# Patient Record
Sex: MALE | Race: OTHER | HISPANIC OR LATINO | URBAN - METROPOLITAN AREA
[De-identification: names, ages, dates, MRNs, and addresses within clinical notes are randomized per-mention and may not be internally consistent; named-entity substitution may affect disease eponyms.]

---

## 2018-02-24 ENCOUNTER — INPATIENT (INPATIENT)
Facility: HOSPITAL | Age: 38
LOS: 5 days | Discharge: HOME | End: 2018-03-02
Attending: PSYCHIATRY & NEUROLOGY

## 2018-02-24 VITALS
OXYGEN SATURATION: 97 % | HEART RATE: 98 BPM | DIASTOLIC BLOOD PRESSURE: 69 MMHG | RESPIRATION RATE: 18 BRPM | SYSTOLIC BLOOD PRESSURE: 118 MMHG | TEMPERATURE: 98 F

## 2018-02-24 DIAGNOSIS — F33.2 MAJOR DEPRESSIVE DISORDER, RECURRENT SEVERE WITHOUT PSYCHOTIC FEATURES: ICD-10-CM

## 2018-02-24 DIAGNOSIS — F31.9 BIPOLAR DISORDER, UNSPECIFIED: ICD-10-CM

## 2018-02-24 DIAGNOSIS — Z72.89 OTHER PROBLEMS RELATED TO LIFESTYLE: ICD-10-CM

## 2018-02-24 DIAGNOSIS — F33.3 MAJOR DEPRESSIVE DISORDER, RECURRENT, SEVERE WITH PSYCHOTIC SYMPTOMS: ICD-10-CM

## 2018-02-24 DIAGNOSIS — R45.851 SUICIDAL IDEATIONS: ICD-10-CM

## 2018-02-24 DIAGNOSIS — F60.2 ANTISOCIAL PERSONALITY DISORDER: ICD-10-CM

## 2018-02-24 LAB
ALBUMIN SERPL ELPH-MCNC: 4.4 G/DL — SIGNIFICANT CHANGE UP (ref 3–5.5)
ALP SERPL-CCNC: 57 U/L — SIGNIFICANT CHANGE UP (ref 30–115)
ALT FLD-CCNC: 21 U/L — SIGNIFICANT CHANGE UP (ref 0–41)
ANION GAP SERPL CALC-SCNC: 10 MMOL/L — SIGNIFICANT CHANGE UP (ref 7–14)
APAP SERPL-MCNC: <10 UG/ML — SIGNIFICANT CHANGE UP (ref 10–30)
APPEARANCE UR: CLEAR — SIGNIFICANT CHANGE UP
AST SERPL-CCNC: 21 U/L — SIGNIFICANT CHANGE UP (ref 0–41)
BASOPHILS # BLD AUTO: 0.02 K/UL — SIGNIFICANT CHANGE UP (ref 0–0.2)
BASOPHILS NFR BLD AUTO: 0.2 % — SIGNIFICANT CHANGE UP (ref 0–1)
BILIRUB DIRECT SERPL-MCNC: 0.2 MG/DL — SIGNIFICANT CHANGE UP (ref 0–0.2)
BILIRUB INDIRECT FLD-MCNC: 0.4 MG/DL — SIGNIFICANT CHANGE UP
BILIRUB SERPL-MCNC: 0.6 MG/DL — SIGNIFICANT CHANGE UP (ref 0.2–1.2)
BILIRUB UR-MCNC: NEGATIVE — SIGNIFICANT CHANGE UP
BUN SERPL-MCNC: 16 MG/DL — SIGNIFICANT CHANGE UP (ref 10–20)
CALCIUM SERPL-MCNC: 9.1 MG/DL — SIGNIFICANT CHANGE UP (ref 8.5–10.1)
CHLORIDE SERPL-SCNC: 108 MMOL/L — SIGNIFICANT CHANGE UP (ref 98–110)
CO2 SERPL-SCNC: 23 MMOL/L — SIGNIFICANT CHANGE UP (ref 17–32)
COLOR SPEC: YELLOW — SIGNIFICANT CHANGE UP
CREAT SERPL-MCNC: 1 MG/DL — SIGNIFICANT CHANGE UP (ref 0.7–1.5)
DIFF PNL FLD: NEGATIVE — SIGNIFICANT CHANGE UP
EOSINOPHIL # BLD AUTO: 0.07 K/UL — SIGNIFICANT CHANGE UP (ref 0–0.7)
EOSINOPHIL NFR BLD AUTO: 0.8 % — SIGNIFICANT CHANGE UP (ref 0–8)
GLUCOSE SERPL-MCNC: 114 MG/DL — HIGH (ref 70–110)
GLUCOSE UR QL: NEGATIVE MG/DL — SIGNIFICANT CHANGE UP
HCT VFR BLD CALC: 42 % — SIGNIFICANT CHANGE UP (ref 42–52)
HGB BLD-MCNC: 14.3 G/DL — SIGNIFICANT CHANGE UP (ref 14–18)
IMM GRANULOCYTES NFR BLD AUTO: 0.4 % — HIGH (ref 0.1–0.3)
KETONES UR-MCNC: NEGATIVE — SIGNIFICANT CHANGE UP
LEUKOCYTE ESTERASE UR-ACNC: NEGATIVE — SIGNIFICANT CHANGE UP
LYMPHOCYTES # BLD AUTO: 2.71 K/UL — SIGNIFICANT CHANGE UP (ref 1.2–3.4)
LYMPHOCYTES # BLD AUTO: 32.8 % — SIGNIFICANT CHANGE UP (ref 20.5–51.1)
MCHC RBC-ENTMCNC: 28.1 PG — SIGNIFICANT CHANGE UP (ref 27–31)
MCHC RBC-ENTMCNC: 34 G/DL — SIGNIFICANT CHANGE UP (ref 32–37)
MCV RBC AUTO: 82.5 FL — SIGNIFICANT CHANGE UP (ref 80–94)
MONOCYTES # BLD AUTO: 0.45 K/UL — SIGNIFICANT CHANGE UP (ref 0.1–0.6)
MONOCYTES NFR BLD AUTO: 5.4 % — SIGNIFICANT CHANGE UP (ref 1.7–9.3)
NEUTROPHILS # BLD AUTO: 4.98 K/UL — SIGNIFICANT CHANGE UP (ref 1.4–6.5)
NEUTROPHILS NFR BLD AUTO: 60.4 % — SIGNIFICANT CHANGE UP (ref 42.2–75.2)
NITRITE UR-MCNC: NEGATIVE — SIGNIFICANT CHANGE UP
PH UR: 6 — SIGNIFICANT CHANGE UP (ref 5–8)
PLATELET # BLD AUTO: 210 K/UL — SIGNIFICANT CHANGE UP (ref 130–400)
POTASSIUM SERPL-MCNC: 4.2 MMOL/L — SIGNIFICANT CHANGE UP (ref 3.5–5)
POTASSIUM SERPL-SCNC: 4.2 MMOL/L — SIGNIFICANT CHANGE UP (ref 3.5–5)
PROT SERPL-MCNC: 6.6 G/DL — SIGNIFICANT CHANGE UP (ref 6–8)
PROT UR-MCNC: NEGATIVE MG/DL — SIGNIFICANT CHANGE UP
RBC # BLD: 5.09 M/UL — SIGNIFICANT CHANGE UP (ref 4.7–6.1)
RBC # FLD: 13.8 % — SIGNIFICANT CHANGE UP (ref 11.5–14.5)
SALICYLATES SERPL-MCNC: <4 MG/DL — SIGNIFICANT CHANGE UP (ref 4–30)
SODIUM SERPL-SCNC: 141 MMOL/L — SIGNIFICANT CHANGE UP (ref 135–146)
SP GR SPEC: 1.02 — SIGNIFICANT CHANGE UP (ref 1.01–1.03)
UROBILINOGEN FLD QL: 1 MG/DL (ref 0.2–0.2)
WBC # BLD: 8.26 K/UL — SIGNIFICANT CHANGE UP (ref 4.8–10.8)
WBC # FLD AUTO: 8.26 K/UL — SIGNIFICANT CHANGE UP (ref 4.8–10.8)

## 2018-02-24 RX ORDER — DIPHENHYDRAMINE HCL 50 MG
50 CAPSULE ORAL EVERY 6 HOURS
Qty: 0 | Refills: 0 | Status: DISCONTINUED | OUTPATIENT
Start: 2018-02-24 | End: 2018-03-02

## 2018-02-24 RX ORDER — QUETIAPINE FUMARATE 200 MG/1
50 TABLET, FILM COATED ORAL AT BEDTIME
Qty: 0 | Refills: 0 | Status: DISCONTINUED | OUTPATIENT
Start: 2018-02-25 | End: 2018-02-26

## 2018-02-24 RX ORDER — ALBUTEROL 90 UG/1
0 AEROSOL, METERED ORAL
Qty: 0 | Refills: 0 | COMMUNITY

## 2018-02-24 RX ADMIN — Medication 50 MILLIGRAM(S): at 22:29

## 2018-02-24 RX ADMIN — Medication 1 MILLIGRAM(S): at 22:29

## 2018-02-24 NOTE — ED BEHAVIORAL HEALTH ASSESSMENT NOTE - CASE SUMMARY
37 year old male with reported past history of emotional dysregulation, behaviors and thoughts of self-harm, as well as a history of prolonged incarceration with extensive solitary confinement; and non-adherence to treatment, who presents to ED with thoughts of suicide. Upon examination, the patient reports behavior consistent with a depressive syndrome, which is corroborated by his girlfriend (phone contact) with whom he lives. The patient's overt phenomenology is less typical of a depressive state, presenting with a more irritable and mistrustful or argumentative demeanor. At this time the patient has been determined to be a risk to himself and demonstrates need for IPP treatment for observation and initiation of treatment; as well as family meetings with his girlfriend (mother of patient's 13 yo daughter). See other initial recommendations above.

## 2018-02-24 NOTE — ED BEHAVIORAL HEALTH ASSESSMENT NOTE - DESCRIPTION
chronic back pain 1:1, labs Patient reports that he is unemployed, receives assistance from his mother, has one daughter who lives with her mother, pt recently moved to [place] from [place] 8 months ago chronic back pain, asthma

## 2018-02-24 NOTE — ED PROVIDER NOTE - PROGRESS NOTE DETAILS
Discussed pt with psychiatry, to come evaluate pt. Labs pending. 38 y/o M PMHx antisocial disorder, Bipolar disorder, and Schizophrenia who presents with suicidal ideations. As per pt, he is having thoughts about wanting to cut himself and [place]. Pt moved to [place] approximately 7 months ago and received previous psychiatric care in [place].  Pt notes last psychiatric admission was last March in [place].  Today police called to scene as pt had razor in triage that he refused to give over when asked to changed and hand over all belongings. Police came and found pt to have razor in his mouth and police now at bedside.  Pt denies any cutting today. PE: As noted above. A/P: Labs, 1:1, and psychiatric evaluation.

## 2018-02-24 NOTE — ED BEHAVIORAL HEALTH ASSESSMENT NOTE - RISK ASSESSMENT
Risk factors: pphx, SA, SI, h/o incarceration, trauma  Protective factors: domiciled, family support Patient's risk of pphx, SA, SI, h/o incarceration, h/o trauma, acute mood symptoms is not mitigated by patient being domiciled and having social support.

## 2018-02-24 NOTE — ED PROVIDER NOTE - OBJECTIVE STATEMENT
36 yo M ho antisocial disorder, schizophrenia, BPD not on meds presents to ED for SI. Pt states he has been cutting himself with a razor and now plans to jump of the [employer] [place]. Pt presented to triage with razor blade, after confiscating personal belonings, staff was unable to find razor. Police were called and razor was found in patients mouth.  Pt now handcuffed to bed with police. Denies any medical complaints of cp, sob, nausea, vomiting, diarrhea, weakness, loc, headache, dizziness, fever, chills. Pt does not take psych meds. Drank etoh this am. Does marijuana but denies cocaine, heroin, and benzo use.

## 2018-02-24 NOTE — ED BEHAVIORAL HEALTH ASSESSMENT NOTE - OTHER
patient was cooperative at first, but became agitated and uncooperative during interview "not good" not assessed at this time unable to assess at this time

## 2018-02-24 NOTE — ED ADULT NURSE NOTE - CHIEF COMPLAINT QUOTE
suicidal ideation, with razor in hand cutting ID band, and swiping across left arm. Security Zechariah notified.

## 2018-02-24 NOTE — ED BEHAVIORAL HEALTH ASSESSMENT NOTE - DIFFERENTIAL
antisocial personality disorder  mood disorder nos MDD with atypical features  MDD with psychotic features  r/o Bipolar d/o  Antisocial personality disorder MDD with atypical features  MDD with psychotic features (pt reports sx of auditory hallucinations but they are not credible)  r/o Bipolar d/o  Antisocial personality disorder

## 2018-02-24 NOTE — ED BEHAVIORAL HEALTH ASSESSMENT NOTE - SUMMARY
37 year old male with extensive pphx, non-adherent with treatment, presents to ED with thoughts of suicide. Upon examination, patient becomes uncooperative and agitated, patient has pressured/loud speech, patient appears irritable and reports low mood. Patient reports suicidal ideation with intent and plan. Patient does not appear to have any perceptual abnormalities. 37 year old male with extensive pphx, non-adherent with treatment, presents to ED with thoughts of suicide. Upon examination, patient becomes uncooperative and agitated, patient has pressured/loud speech, patient appears irritable and reports low mood. Patient reports suicidal ideation with intent and plan. Patient does not appear to have any perceptual abnormalities at this time. 37 year old male with extensive pphx, non-adherent with treatment, presents to ED with thoughts of suicide. Upon examination, patient becomes uncooperative and agitated at times, patient has pressured/loud speech, patient appears irritable, demonstrates impulsivity and reports low mood. Patient reports suicidal ideation with intent and plan. Patient does not appear to have any perceptual abnormalities during the interview. Given exam, patient subjective and collateral, patient appears to meet criteria for acute mood symptoms. At this time patient, appears to be ar risk to himself and demonstrates need for IPP stabilization and treatment.

## 2018-02-24 NOTE — CHART NOTE - NSCHARTNOTEFT_GEN_A_CORE
pt is very agitated  , pacing in siu way , irritable .   give Ativan 1 mg po abd Benadryl 50 mg as PRN NOW , CONTNUE 1;1 OBSERVATION

## 2018-02-24 NOTE — ED BEHAVIORAL HEALTH ASSESSMENT NOTE - AXIS IV
Problems with primary support/Problems with interaction with legal system Problems with interaction with legal system/Problems with primary support/Occupational problems/Economic problems/Problem related to social environment

## 2018-02-24 NOTE — H&P ADULT - HISTORY OF PRESENT ILLNESS
36 y/o Male h/o antisocial disorder, schizophrenia, BPD not on meds presents to ED for SI. Pt states he has been cutting himself with a razor and now plans to jump of the [employer] [place]. Pt presented to triage with razor blade, after confiscating personal belonings, staff was unable to find razor. Police were called and razor was found in patients mouth.  Pt now handcuffed to bed with police. Denies any medical complaints of cp, sob, nausea, vomiting, diarrhea, weakness, loc, headache, dizziness, fever, chills. Pt does not take psych meds. Drank etoh this am. Does marijuana but denies cocaine, heroin, and benzo use.  Presently pt denies any SI.

## 2018-02-24 NOTE — ED BEHAVIORAL HEALTH ASSESSMENT NOTE - PRIMARY DX
Severe episode of recurrent major depressive disorder, with psychotic features Severe episode of recurrent major depressive disorder, without psychotic features

## 2018-02-24 NOTE — ED PROVIDER NOTE - CARE PLAN
Principal Discharge DX:	Suicidal intent  Secondary Diagnosis:	Severe persistent asthma, unspecified whether complicated  Secondary Diagnosis:	Schizophrenia, unspecified type
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
## 2018-02-24 NOTE — ED PROVIDER NOTE - NS ED ROS FT
Eyes:  No visual changes  ENMT:  No neck pain or stiffness.  Cardiac:  No chest pain, SOB   Respiratory:  No cough   GI:  No nausea, vomiting, diarrhea or abdominal pain.  :  No dysuria  MS:  No back pain.  Neuro:  No headache or weakness.  No LOC.  Skin:  No skin rash.

## 2018-02-24 NOTE — ED ADULT TRIAGE NOTE - CHIEF COMPLAINT QUOTE
suicidal ideation suicidal ideation, with razor in hand cutting ID band, and swiping across left arm. Security Zechariah notified.

## 2018-02-24 NOTE — ED PROVIDER NOTE - ATTENDING CONTRIBUTION TO CARE
I have personally performed a history and physical exam on this patient and personally directed the management of the patient with the resident.

## 2018-02-24 NOTE — ED BEHAVIORAL HEALTH ASSESSMENT NOTE - PAST PSYCHOTROPIC MEDICATION
patient does not recall past medications patient does not recall past medications other than those listed below under adverse reactions

## 2018-02-24 NOTE — ED BEHAVIORAL HEALTH ASSESSMENT NOTE - OTHER PAST PSYCHIATRIC HISTORY (INCLUDE DETAILS REGARDING ONSET, COURSE OF ILLNESS, INPATIENT/OUTPATIENT TREATMENT)
Patient reports multiple IPP admissions, most recently at [place], [place], patient reports that he was seeing a therapist at [facility] in [place], patient reports 3 SA in lifetime (2 attempts to hang himself, 1 attempt to cut himself), Patient reports multiple IPP admissions, most recently at [place], [place], patient reports that he was seeing a therapist at [facility] in [place] in February 2017, patient reports 3 SA in lifetime (2 attempts to hang himself, 1 attempt to cut himself),

## 2018-02-24 NOTE — ED PROVIDER NOTE - PHYSICAL EXAMINATION
CONSTITUTIONAL: Well-developed; well-nourished; aggitated but conversable   SKIN: warm, dry  HEAD: Normocephalic; atraumatic.  EYES: no conj injection  CARD: S1, S2 normal; no murmurs, gallops, or rubs. Regular rate and rhythm.   RESP: No wheezes, rales or rhonchi.  ABD: soft ntnd  EXT: Normal ROM.    NEURO: Alert, oriented, No gross deficits   PSYCH: Agitated, pressured speak CONSTITUTIONAL: Well-developed; well-nourished; agitated but conversable and cooperative  SKIN: warm, dry  HEAD: Normocephalic; atraumatic.  EYES: no conj injection  CARD: S1, S2 normal; no murmurs, gallops, or rubs. Regular rate and rhythm.   RESP: No wheezes, rales or rhonchi.  ABD: soft ntnd  EXT: Normal ROM, No lacerations noted on exam, there are old healed lacerations to LUE/   NEURO: Alert, oriented, No gross deficits   PSYCH: +Agitated, +pressured speech, +flight of ideas, poor judgement, denies HI. +SI

## 2018-02-24 NOTE — H&P ADULT - PMH
Bipolar 1 disorder    Schizophrenia, unspecified type    Self-inflicted injury  cutting  Severe persistent asthma, unspecified whether complicated

## 2018-02-24 NOTE — PATIENT PROFILE BEHAVIORAL HEALTH - NS TRANSFER PATIENT BELONGINGS
2 cell phones, a watch, one sun glasses, a pair of shoe/Cell Phone/PDA (specify)/Wrist Watch/Clothing
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
## 2018-02-24 NOTE — ED ADULT NURSE NOTE - OBJECTIVE STATEMENT
pt presents to the ED with suicidal thoughts. 2 officers at the bed side at this time. pt found to have razor on person in triage. pt in hosptial gown and belongings with security. pt calm/cooperative.

## 2018-02-24 NOTE — ED ADULT NURSE NOTE - PMH
Bipolar 1 disorder    Schizophrenia, unspecified type    Severe persistent asthma, unspecified whether complicated

## 2018-02-25 RX ORDER — NICOTINE POLACRILEX 2 MG
1 GUM BUCCAL DAILY
Qty: 0 | Refills: 0 | Status: DISCONTINUED | OUTPATIENT
Start: 2018-02-25 | End: 2018-03-02

## 2018-02-25 RX ADMIN — Medication 50 MILLIGRAM(S): at 20:33

## 2018-02-25 RX ADMIN — Medication 1 PATCH: at 14:52

## 2018-02-25 RX ADMIN — Medication 1 MILLIGRAM(S): at 20:33

## 2018-02-25 RX ADMIN — Medication 1 MILLIGRAM(S): at 11:31

## 2018-02-25 NOTE — PROGRESS NOTE BEHAVIORAL HEALTH - NSBHFUPINTERVALHXFT_PSY_A_CORE
Pt's history and medication reviewed and verified.  Pt states that he feels better now that he is in the hospital for he now feels safe.  He denies being severely depressed at present and stated that he no longer has any suicidal ideation, plan or intent to die.   Pt states, that because of his experiences while in senior living, he currently "Feels paranoid" when being followed by 1:1 Constant observation staff and requesting to be off 1:1 constant observation. He currently denies any suicidal ideation, plan or intent to die and is cammie for safety that if he will have thoughts again of wanting to die he will tell staff and ask for help.  He states that his reasons to live are his girlfriend and family. Pt currently does not pose any substantial risk of harm to self/others and will be discontinued from 1:1 constant observation and will be placed on routine Q20 min check level.

## 2018-02-26 DIAGNOSIS — F31.63 BIPOLAR DISORDER, CURRENT EPISODE MIXED, SEVERE, WITHOUT PSYCHOTIC FEATURES: ICD-10-CM

## 2018-02-26 DIAGNOSIS — F60.2 ANTISOCIAL PERSONALITY DISORDER: ICD-10-CM

## 2018-02-26 DIAGNOSIS — F12.10 CANNABIS ABUSE, UNCOMPLICATED: ICD-10-CM

## 2018-02-26 DIAGNOSIS — F20.9 SCHIZOPHRENIA, UNSPECIFIED: ICD-10-CM

## 2018-02-26 RX ORDER — BENZTROPINE MESYLATE 1 MG
2 TABLET ORAL
Qty: 0 | Refills: 0 | Status: DISCONTINUED | OUTPATIENT
Start: 2018-02-26 | End: 2018-03-02

## 2018-02-26 RX ORDER — OLANZAPINE 15 MG/1
5 TABLET, FILM COATED ORAL
Qty: 0 | Refills: 0 | Status: DISCONTINUED | OUTPATIENT
Start: 2018-02-26 | End: 2018-02-27

## 2018-02-26 RX ORDER — OLANZAPINE 15 MG/1
5 TABLET, FILM COATED ORAL
Qty: 0 | Refills: 0 | Status: DISCONTINUED | OUTPATIENT
Start: 2018-02-26 | End: 2018-02-26

## 2018-02-26 RX ORDER — LITHIUM CARBONATE 300 MG/1
300 TABLET, EXTENDED RELEASE ORAL
Qty: 0 | Refills: 0 | Status: DISCONTINUED | OUTPATIENT
Start: 2018-02-27 | End: 2018-02-28

## 2018-02-26 RX ADMIN — Medication 1 MILLIGRAM(S): at 11:57

## 2018-02-26 RX ADMIN — Medication 2 MILLIGRAM(S): at 21:45

## 2018-02-26 RX ADMIN — Medication 1 PATCH: at 08:39

## 2018-02-26 RX ADMIN — Medication 2 MILLIGRAM(S): at 11:58

## 2018-02-26 RX ADMIN — OLANZAPINE 5 MILLIGRAM(S): 15 TABLET, FILM COATED ORAL at 21:48

## 2018-02-26 RX ADMIN — OLANZAPINE 5 MILLIGRAM(S): 15 TABLET, FILM COATED ORAL at 11:58

## 2018-02-26 NOTE — PROGRESS NOTE BEHAVIORAL HEALTH - NSBHCHARTREVIEWLAB_PSY_A_CORE FT
24 Feb 2018 07:48  Sodium 141 [135 - 146]  Chloride 108 [98 - 110]  BUN 16 [10 - 20]  Glucose 114<H> [70 - 110]  Potassium 4.2 [3.5 - 5.0]  Anion Gap 10 [7 - 14]  Carbon dioxide 23 [17 - 32]  Creatinine 1.0 [0.7 - 1.5]      Ca    9.1 [8.5 - 10.1]      24 Feb 2018 07:48        24 Feb 2018 07:48  TPro 6.6 [6.0 - 8.0]  Alb  4.4 [3.0 - 5.5]   TBili 0.6 [0.2 - 1.2]   DBili 0.2 [0.0 - 0.2]   AST  21 [0 - 41]  ALT  21 [0 - 41]   AlkPhos 57 [30 - 115]

## 2018-02-26 NOTE — PROGRESS NOTE BEHAVIORAL HEALTH - NSBHFUPINTERVALHXFT_PSY_A_CORE
Pt currently denies any suicidal ideation, plan or intent to die and is cammie for safety that if he will have thoughts again of wanting to die he will tell staff and ask for help.  He states that his reasons to live are his girlfriend and family. Pt reports AH , voice talking to him commenting on his actions etc. Pt is intense, irritable, labile, becomes easily agitated. Zyprexa 5 mg po bid was started for mood stabilization/psychosis. Cogerntin 2 mg po bid was staetd fro EPS prophylaxis b/c pt has h/o pg eps in past . Lithium to start tomorrow.

## 2018-02-26 NOTE — CONSULT NOTE ADULT - PROBLEM SELECTOR RECOMMENDATION 9
continue present treatment as per psych plan as reviewed  Medically stable with no new changes in treatment  will continue to monitor medical status while being treated on psych

## 2018-02-26 NOTE — CONSULT NOTE ADULT - SUBJECTIVE AND OBJECTIVE BOX
KELLI SHANNON  37y  Male      Patient is a 37y old  Male who presents with a chief complaint of Suicidal Ideation (2018 21:32)  Patient is a 37y old  Male who presents with a chief complaint of Suicidal Ideation (2018 21:32)  HEALTH ISSUES - R/O PROBLEM Dx:  HEALTH ISSUES - PROBLEM Dx:  Suicidal intent: Suicidal intent  Self-inflicted injury: Self-inflicted injury  Bipolar 1 disorder: Bipolar 1 disorder  Antisocial personality disorder in adult  Severe episode of recurrent major depressive disorder, without psychotic features  Severe episode of recurrent major depressive disorder, with psychotic features      PAST MEDICAL & SURGICAL HISTORY:  Self-inflicted injury: cutting  Severe persistent asthma, unspecified whether complicated  Bipolar 1 disorder  Schizophrenia, unspecified type  MEDICATIONS  (STANDING):  nicotine - 21 mG/24Hr(s) Patch 1 patch Transdermal daily  QUEtiapine 50 milliGRAM(s) Oral at bedtime    MEDICATIONS  (PRN):  diphenhydrAMINE   Capsule 50 milliGRAM(s) Oral every 6 hours PRN agiataionagiataion  LORazepam     Tablet 1 milliGRAM(s) Oral every 6 hours PRN Agitation      INTERVAL HPI/OVERNIGHT EVENTS:        REVIEW OF SYSTEMS:  CONSTITUTIONAL: No fever, weight loss, or fatigue  Allergies    Geodon (Unknown)  Haldol (Unknown)  Prolixin (Unknown)  Thorazine (Unknown)    Intolerances    EYES: No eye pain, visual disturbances, or discharge  ENMT:  No difficulty hearing, tinnitus, vertigo; No sinus or throat pain  NECK: No pain or stiffness  BREASTS: No pain, masses, or nipple discharge  RESPIRATORY: No cough, wheezing, chills or hemoptysis; No shortness of breath  CARDIOVASCULAR: No chest pain, palpitations, dizziness, or leg swelling  GASTROINTESTINAL: No abdominal or epigastric pain. No nausea, vomiting, or hematemesis; No diarrhea or constipation. No melena or hematochezia.  GENITOURINARY: No dysuria, frequency, hematuria, or incontinence  NEUROLOGICAL: No headaches, memory loss, loss of strength, numbness, or tremors  SKIN: No itching, burning, rashes, or lesions   LYMPH NODES: No enlarged glands  ENDOCRINE: No heat or cold intolerance; No hair loss  MUSCULOSKELETAL: No joint pain or swelling; No muscle, back, or extremity pain  PSYCHIATRIC: No depression, anxiety, mood swings, or difficulty sleeping  HEME/LYMPH: No easy bruising, or bleeding gums  ALLERY AND IMMUNOLOGIC: No hives or eczema    T(C): 35.6 (18 @ 06:03), Max: 36.6 (18 @ 12:42)  HR: 50 (18 @ 06:03) (50 - 67)  BP: 128/61 (18 @ 06:03) (128/61 - 137/84)  RR: 16 (18 @ 06:03) (16 - 18)  SpO2: --  Wt(kg): --Vital Signs Last 24 Hrs  T(C): 35.6 (2018 06:03), Max: 36.6 (2018 12:42)  T(F): 96.1 (2018 06:03), Max: 97.9 (2018 12:42)  HR: 50 (2018 06:03) (50 - 67)  BP: 128/61 (2018 06:03) (128/61 - 137/84)  BP(mean): --  RR: 16 (2018 06:03) (16 - 18)  SpO2: --    PHYSICAL EXAM:  GENERAL: NAD, well-groomed, well-developed  HEAD:  Atraumatic, Normocephalic  EYES: EOMI, PERRLA, conjunctiva and sclera clear  ENMT: No tonsillar erythema, exudates, or enlargement; Moist mucous membranes, Good dentition, No lesions  NECK: Supple, No JVD, Normal thyroid  NERVOUS SYSTEM:  Alert & Oriented X3, Good concentration; Motor Strength 5/5 B/L upper and lower extremities; DTRs 2+ intact and symmetric  CHEST/LUNG: Clear to percussion bilaterally; No rales, rhonchi, wheezing, or rubs  HEART: Regular rate and rhythm; No murmurs, rubs, or gallops  ABDOMEN: Soft, Nontender, Nondistended; Bowel sounds present  EXTREMITIES:  2+ Peripheral Pulses, No clubbing, cyanosis, or edema  LYMPH: No lymphadenopathy noted  SKIN: No rashes or lesions    Consultant(s) Notes Reviewed:  [x ] YES  [ ] NO  Care Discussed with Consultants/Other Providers [ x] YES  [ ] NO    LABS:            Urinalysis Basic - ( 2018 13:34 )    Color: Yellow / Appearance: Clear / S.025 / pH: x  Gluc: x / Ketone: Negative  / Bili: Negative / Urobili: 1.0 mg/dL   Blood: x / Protein: Negative mg/dL / Nitrite: Negative   Leuk Esterase: Negative / RBC: x / WBC x   Sq Epi: x / Non Sq Epi: x / Bacteria: x      CAPILLARY BLOOD GLUCOSE            Urinalysis Basic - ( 2018 13:34 )    Color: Yellow / Appearance: Clear / S.025 / pH: x  Gluc: x / Ketone: Negative  / Bili: Negative / Urobili: 1.0 mg/dL   Blood: x / Protein: Negative mg/dL / Nitrite: Negative   Leuk Esterase: Negative / RBC: x / WBC x   Sq Epi: x / Non Sq Epi: x / Bacteria: x        RADIOLOGY & ADDITIONAL TESTS:    Imaging Personally Reviewed:  [ ] YES  [ ] NO

## 2018-02-27 DIAGNOSIS — F43.10 POST-TRAUMATIC STRESS DISORDER, UNSPECIFIED: ICD-10-CM

## 2018-02-27 LAB
CHOLEST SERPL-MCNC: 174 MG/DL — SIGNIFICANT CHANGE UP (ref 100–200)
HDLC SERPL-MCNC: 36 MG/DL — LOW (ref 40–60)
LIPID PNL WITH DIRECT LDL SERPL: 110 MG/DL — HIGH (ref 50–100)
TOTAL CHOLESTEROL/HDL RATIO MEASUREMENT: 4.8 RATIO — SIGNIFICANT CHANGE UP (ref 4–5.5)
TRIGL SERPL-MCNC: 103 MG/DL — SIGNIFICANT CHANGE UP (ref 40–150)

## 2018-02-27 RX ORDER — OLANZAPINE 15 MG/1
7.5 TABLET, FILM COATED ORAL
Qty: 0 | Refills: 0 | Status: DISCONTINUED | OUTPATIENT
Start: 2018-02-27 | End: 2018-03-02

## 2018-02-27 RX ADMIN — Medication 1 MILLIGRAM(S): at 13:18

## 2018-02-27 RX ADMIN — Medication 1 MILLIGRAM(S): at 21:33

## 2018-02-27 RX ADMIN — Medication 1 PATCH: at 09:43

## 2018-02-27 RX ADMIN — OLANZAPINE 5 MILLIGRAM(S): 15 TABLET, FILM COATED ORAL at 09:45

## 2018-02-27 RX ADMIN — OLANZAPINE 7.5 MILLIGRAM(S): 15 TABLET, FILM COATED ORAL at 20:50

## 2018-02-27 RX ADMIN — Medication 2 MILLIGRAM(S): at 20:50

## 2018-02-27 RX ADMIN — Medication 50 MILLIGRAM(S): at 21:32

## 2018-02-27 RX ADMIN — LITHIUM CARBONATE 300 MILLIGRAM(S): 300 TABLET, EXTENDED RELEASE ORAL at 09:43

## 2018-02-27 RX ADMIN — Medication 2 MILLIGRAM(S): at 09:43

## 2018-02-27 RX ADMIN — LITHIUM CARBONATE 300 MILLIGRAM(S): 300 TABLET, EXTENDED RELEASE ORAL at 20:50

## 2018-02-27 NOTE — PROGRESS NOTE BEHAVIORAL HEALTH - NSBHFUPINTERVALHXFT_PSY_A_CORE
Pt slept well, continues to experience AH today. Less irritable, less labile, more redirectable. Pt currently denies any suicidal ideation, plan or intent to die and is cammie for safety that if he will have thoughts again of wanting to die he will tell staff and ask for help.  He states that his reasons to live are his mother.   Samn gto increase Zyprexa to 7.5 mg po bid.  Was started on lithium for mood stabilization/psychosis. Cogerntin 2 mg po bid was staetd fro EPS prophylaxis b/c pt has h/o pg eps in past . Pt slept well, continues to experience AH today. Less irritable, less labile, more redirectable. Pt currently denies any suicidal ideation, plan or intent to die and is cammie for safety that if he will have thoughts again of wanting to die he will tell staff and ask for help.  He states that his reasons to live are his mother.   Sima gto increase Zyprexa to 7.5 mg po bid.  Was started on lithium for mood stabilization/psychosis. Cogerntin 2 mg po bid was staetd ashlie EPS prophylaxis b/c pt has h/o pg eps in past .  Called and left message [facility] [phone]

## 2018-02-27 NOTE — PROGRESS NOTE ADULT - SUBJECTIVE AND OBJECTIVE BOX
pt stable alert in NAD  no new complaints    Geodon (Unknown)  Haldol (Unknown)  Prolixin (Unknown)  Thorazine (Unknown)          benztropine 2 milliGRAM(s) Oral two times a day  diphenhydrAMINE   Capsule 50 milliGRAM(s) Oral every 6 hours PRN  lithium 300 milliGRAM(s) Oral two times a day  LORazepam     Tablet 1 milliGRAM(s) Oral every 6 hours PRN  nicotine - 21 mG/24Hr(s) Patch 1 patch Transdermal daily  OLANZapine 5 milliGRAM(s) Oral two times a day      T(C): 35.8 (02-27-18 @ 05:54), Max: 35.8 (02-27-18 @ 05:54)  HR: 50 (02-27-18 @ 05:54) (50 - 50)  BP: 104/63 (02-27-18 @ 05:54) (104/63 - 104/63)  RR: 18 (02-27-18 @ 05:54) (18 - 18)  SpO2: --    PE;  general:  stble no acute changes    Lungs:    Heart:    Neuro:

## 2018-02-28 LAB — TSH SERPL-MCNC: 1.24 UIU/ML — SIGNIFICANT CHANGE UP (ref 0.27–4.2)

## 2018-02-28 RX ORDER — LITHIUM CARBONATE 300 MG/1
450 TABLET, EXTENDED RELEASE ORAL EVERY 12 HOURS
Qty: 0 | Refills: 0 | Status: DISCONTINUED | OUTPATIENT
Start: 2018-02-28 | End: 2018-03-02

## 2018-02-28 RX ORDER — HYDROXYZINE HCL 10 MG
50 TABLET ORAL EVERY 6 HOURS
Qty: 0 | Refills: 0 | Status: DISCONTINUED | OUTPATIENT
Start: 2018-02-28 | End: 2018-03-02

## 2018-02-28 RX ADMIN — Medication 1 MILLIGRAM(S): at 18:32

## 2018-02-28 RX ADMIN — LITHIUM CARBONATE 300 MILLIGRAM(S): 300 TABLET, EXTENDED RELEASE ORAL at 09:02

## 2018-02-28 RX ADMIN — Medication 1 PATCH: at 09:04

## 2018-02-28 RX ADMIN — LITHIUM CARBONATE 450 MILLIGRAM(S): 300 TABLET, EXTENDED RELEASE ORAL at 20:22

## 2018-02-28 RX ADMIN — Medication 1 MILLIGRAM(S): at 11:59

## 2018-02-28 RX ADMIN — Medication 2 MILLIGRAM(S): at 21:26

## 2018-02-28 RX ADMIN — OLANZAPINE 7.5 MILLIGRAM(S): 15 TABLET, FILM COATED ORAL at 09:04

## 2018-02-28 RX ADMIN — OLANZAPINE 7.5 MILLIGRAM(S): 15 TABLET, FILM COATED ORAL at 20:22

## 2018-02-28 RX ADMIN — Medication 2 MILLIGRAM(S): at 09:03

## 2018-02-28 NOTE — PROGRESS NOTE BEHAVIORAL HEALTH - NSBHFUPINTERVALHXFT_PSY_A_CORE
Pt slept well, continues to experience AH today. Less irritable, less labile, more redirectable. Pt wants to leave [facility] [place] as soon as possible. Pt quite anxious requiring frequent prns. Pt currently denies any suicidal ideation, plan or intent to die and is cammie for safety that if he will have thoughts again of wanting to die he will tell staff and ask for help.  He states that his reasons to live are his mother.    Zyprexa was increased to to 7.5 mg po bid.  Lithium was arised ro 450 mg po bid .   Called again and spoke with [facility] [phone]. She is willing to provide support and reassurance to pt to continue hospital saty and tx. Pt slept well, continues to experience AH today. Less irritable, less labile, more redirectable. Pt wants to leave [facility] as soon as possible. Pt quite anxious requiring frequent prns. Pt currently denies any suicidal ideation, plan or intent to die and is cammie for safety that if he will have thoughts again of wanting to die he will tell staff and ask for help.  He states that his reasons to live are his mother.    Zyprexa was increased to to 7.5 mg po bid.  Lithium was arised ro 450 mg po bid .   Called again and spoke with [facility] [phone]. She is willing to provide support and reassurance to pt to continue hospital saty and tx.  Will gradually dc Ativan PRN and replace with Visatril PRN. for anxiety.

## 2018-03-01 RX ORDER — DOCUSATE SODIUM 100 MG
100 CAPSULE ORAL DAILY
Qty: 0 | Refills: 0 | Status: DISCONTINUED | OUTPATIENT
Start: 2018-03-01 | End: 2018-03-02

## 2018-03-01 RX ADMIN — Medication 1 PATCH: at 10:51

## 2018-03-01 RX ADMIN — Medication 50 MILLIGRAM(S): at 21:13

## 2018-03-01 RX ADMIN — Medication 2 MILLIGRAM(S): at 08:16

## 2018-03-01 RX ADMIN — Medication 1 MILLIGRAM(S): at 16:47

## 2018-03-01 RX ADMIN — OLANZAPINE 7.5 MILLIGRAM(S): 15 TABLET, FILM COATED ORAL at 08:16

## 2018-03-01 RX ADMIN — Medication 2 MILLIGRAM(S): at 20:10

## 2018-03-01 RX ADMIN — Medication 1 MILLIGRAM(S): at 05:23

## 2018-03-01 RX ADMIN — Medication 1 PATCH: at 10:52

## 2018-03-01 RX ADMIN — LITHIUM CARBONATE 450 MILLIGRAM(S): 300 TABLET, EXTENDED RELEASE ORAL at 20:10

## 2018-03-01 RX ADMIN — Medication 1 MILLIGRAM(S): at 10:51

## 2018-03-01 RX ADMIN — LITHIUM CARBONATE 450 MILLIGRAM(S): 300 TABLET, EXTENDED RELEASE ORAL at 08:15

## 2018-03-01 RX ADMIN — OLANZAPINE 7.5 MILLIGRAM(S): 15 TABLET, FILM COATED ORAL at 20:10

## 2018-03-01 RX ADMIN — Medication 1 MILLIGRAM(S): at 21:52

## 2018-03-01 NOTE — PROGRESS NOTE BEHAVIORAL HEALTH - PROBLEM SELECTOR PROBLEM 3
Antisocial personality disorder

## 2018-03-01 NOTE — PROGRESS NOTE BEHAVIORAL HEALTH - PROBLEM SELECTOR PLAN 4
During this stay in the hospital , the patient will receive tx for suicidality to include  medication treatment, individual and group therapy, psychoeducation and medication education.

## 2018-03-01 NOTE — PROGRESS NOTE BEHAVIORAL HEALTH - NSBHADMITCOORDWITH_PSY_A_CORE
medical staff/social work
medical staff/social work
social work/medical staff
medical staff/social work

## 2018-03-01 NOTE — PROGRESS NOTE BEHAVIORAL HEALTH - RISK ASSESSMENT
Pt currently denies any suicidal ideation, plan or intent to die and is cammie for safety that if he will have thoughts again of wanting to die he will tell staff and ask for help.
Pt currently denies any suicidal ideation, plan or intent to die and is cammie for safety that if he will have thoughts again of wanting to die he will tell staff and ask for help.
no si/hi

## 2018-03-01 NOTE — PROGRESS NOTE BEHAVIORAL HEALTH - PRIMARY DX
Bipolar 1 disorder, mixed, severe

## 2018-03-01 NOTE — PROGRESS NOTE BEHAVIORAL HEALTH - AXIS III
chronic back pain, asthma

## 2018-03-01 NOTE — PROGRESS NOTE ADULT - SUBJECTIVE AND OBJECTIVE BOX
pt stable alert in NAD  no new complaints    Geodon (Unknown)  Haldol (Unknown)  Prolixin (Unknown)  Thorazine (Unknown)          benztropine 2 milliGRAM(s) Oral two times a day  diphenhydrAMINE   Capsule 50 milliGRAM(s) Oral every 6 hours PRN  hydrOXYzine hydrochloride 50 milliGRAM(s) Oral every 6 hours PRN  lithium 450 milliGRAM(s) Oral every 12 hours  LORazepam     Tablet 1 milliGRAM(s) Oral every 6 hours PRN  LORazepam     Tablet 1 milliGRAM(s) Oral two times a day PRN  nicotine - 21 mG/24Hr(s) Patch 1 patch Transdermal daily  OLANZapine 7.5 milliGRAM(s) Oral two times a day      T(C): 42.8 (02-28-18 @ 18:48), Max: 42.8 (02-28-18 @ 18:48)  HR: 52 (03-01-18 @ 06:12) (52 - 109)  BP: 105/51 (03-01-18 @ 06:12) (105/51 - 140/92)  RR: 18 (03-01-18 @ 06:12) (16 - 20)  SpO2: --    PE;  general: stable    Lungs:    Heart:    Neuro:

## 2018-03-01 NOTE — PROGRESS NOTE BEHAVIORAL HEALTH - PRN MEDS
LORazepam     Tablet   1 milliGRAM(s) Oral (03-01-18 @ 10:51)   1 milliGRAM(s) Oral (03-01-18 @ 05:23)   1 milliGRAM(s) Oral (02-28-18 @ 18:32)
diphenhydrAMINE   Capsule   50 milliGRAM(s) Oral (02-27-18 @ 21:32)    LORazepam     Tablet   1 milliGRAM(s) Oral (02-28-18 @ 11:59)   1 milliGRAM(s) Oral (02-27-18 @ 21:33)
LORazepam     Tablet   1 milliGRAM(s) Oral (02-27-18 @ 13:18)
diphenhydrAMINE   Capsule   50 milliGRAM(s) Oral (02-25-18 @ 20:33)    LORazepam     Tablet   1 milliGRAM(s) Oral (02-26-18 @ 11:57)   1 milliGRAM(s) Oral (02-25-18 @ 20:33)

## 2018-03-01 NOTE — PROGRESS NOTE BEHAVIORAL HEALTH - NSBHADMITCOUNSEL_PSY_A_CORE
diagnostic results/impressions and/or recommended studies/instructions for management, treatment and follow up/risk factor reduction/importance of adherence to chosen treatment/client/family/caregiver education
diagnostic results/impressions and/or recommended studies/importance of adherence to chosen treatment/client/family/caregiver education/instructions for management, treatment and follow up/risk factor reduction
diagnostic results/impressions and/or recommended studies/instructions for management, treatment and follow up/importance of adherence to chosen treatment/risk factor reduction/client/family/caregiver education
client/family/caregiver education/instructions for management, treatment and follow up/risk factor reduction/diagnostic results/impressions and/or recommended studies/importance of adherence to chosen treatment

## 2018-03-01 NOTE — PROGRESS NOTE BEHAVIORAL HEALTH - NSBHCHARTREVIEWVS_PSY_A_CORE FT
T(C): 36.9 (03-01-18 @ 10:14), Max: 42.8 (02-28-18 @ 18:48)  HR: 90 (03-01-18 @ 10:14) (52 - 109)  BP: 139/62 (03-01-18 @ 10:14) (105/51 - 140/92)  RR: 17 (03-01-18 @ 10:14) (17 - 20)  SpO2: --
T(C): 35.4 (02-28-18 @ 05:33), Max: 35.4 (02-28-18 @ 05:33)  HR: 54 (02-28-18 @ 10:27) (50 - 54)  BP: 115/53 (02-28-18 @ 10:27) (93/55 - 115/53)  RR: 16 (02-28-18 @ 10:27) (16 - 16)  SpO2: --
T(C): 36.7 (02-27-18 @ 10:15), Max: 36.7 (02-27-18 @ 10:15)  HR: 87 (02-27-18 @ 10:15) (50 - 87)  BP: 118/63 (02-27-18 @ 10:15) (104/63 - 118/63)  RR: 20 (02-27-18 @ 10:15) (18 - 20)  SpO2: --
T(C): 35.6 (02-26-18 @ 06:03), Max: 35.6 (02-26-18 @ 06:03)  HR: 50 (02-26-18 @ 06:03) (50 - 50)  BP: 128/61 (02-26-18 @ 06:03) (128/61 - 128/61)  RR: 16 (02-26-18 @ 06:03) (16 - 16)  SpO2: --

## 2018-03-01 NOTE — PROGRESS NOTE BEHAVIORAL HEALTH - NSBHFUPINTERVALCCFT_PSY_A_CORE
Can I have my clothes
I want to leave today
I'm feeling better
I'm feeling better
Called to Floor re: Pt referred to reevaluate the need for 1:1 constant observation due pt is currently saying he is no longer suicidal and "Feels paranoid" when being followed-around by 1:1 constant Observation staff.

## 2018-03-01 NOTE — PROGRESS NOTE BEHAVIORAL HEALTH - PROBLEM SELECTOR PLAN 1
During this stay in the hospital , the patient will receive tx for mood instability to include  medication treatment, individual and group therapy, psychoeducation and medication education.

## 2018-03-01 NOTE — PROGRESS NOTE BEHAVIORAL HEALTH - NSBHFUPINTERVALHXFT_PSY_A_CORE
Pt slept well, denies AH today. Less irritable, less labile, more redirectable. Pt wants to leave hospital as soon as possible.  Pt  denies and presents no evidence for any suicidal or homicidal ideation, plan or intent. Syanding Meds include Zyprexa  7.5 mg po bid.  Lithium  450 mg po bid .   Sp[facility] with [facility] [phone] yesterday. She is supportive of pt. Discharge scheduled fro tomorrow. Pt will live with her on discharge.

## 2018-03-01 NOTE — PROGRESS NOTE BEHAVIORAL HEALTH - NSBHADMITDANGERSELF_PSY_A_CORE
unable to care for self

## 2018-03-01 NOTE — PROGRESS NOTE BEHAVIORAL HEALTH - SECONDARY DX3
PTSD (post-traumatic stress disorder)

## 2018-03-02 VITALS
TEMPERATURE: 99 F | SYSTOLIC BLOOD PRESSURE: 136 MMHG | DIASTOLIC BLOOD PRESSURE: 88 MMHG | HEART RATE: 92 BPM | RESPIRATION RATE: 18 BRPM

## 2018-03-02 RX ORDER — LITHIUM CARBONATE 300 MG/1
1 TABLET, EXTENDED RELEASE ORAL
Qty: 60 | Refills: 0 | OUTPATIENT
Start: 2018-03-02 | End: 2018-03-31

## 2018-03-02 RX ORDER — DOCUSATE SODIUM 100 MG
1 CAPSULE ORAL
Qty: 0 | Refills: 0 | COMMUNITY
Start: 2018-03-02

## 2018-03-02 RX ORDER — OLANZAPINE 15 MG/1
1 TABLET, FILM COATED ORAL
Qty: 60 | Refills: 0 | OUTPATIENT
Start: 2018-03-02 | End: 2018-03-31

## 2018-03-02 RX ORDER — HYDROXYZINE HCL 10 MG
1 TABLET ORAL
Qty: 30 | Refills: 0 | OUTPATIENT
Start: 2018-03-02

## 2018-03-02 RX ORDER — BENZTROPINE MESYLATE 1 MG
1 TABLET ORAL
Qty: 60 | Refills: 0 | OUTPATIENT
Start: 2018-03-02 | End: 2018-03-31

## 2018-03-02 RX ORDER — NICOTINE POLACRILEX 2 MG
21 GUM BUCCAL
Qty: 0 | Refills: 0 | COMMUNITY
Start: 2018-03-02

## 2018-03-02 RX ADMIN — Medication 1 PATCH: at 08:11

## 2018-03-02 RX ADMIN — Medication 1 MILLIGRAM(S): at 06:03

## 2018-03-02 RX ADMIN — LITHIUM CARBONATE 450 MILLIGRAM(S): 300 TABLET, EXTENDED RELEASE ORAL at 08:10

## 2018-03-02 RX ADMIN — Medication 2 MILLIGRAM(S): at 08:10

## 2018-03-02 RX ADMIN — OLANZAPINE 7.5 MILLIGRAM(S): 15 TABLET, FILM COATED ORAL at 08:10

## 2018-03-02 RX ADMIN — Medication 1 MILLIGRAM(S): at 09:20
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
## 2018-03-02 NOTE — DISCHARGE NOTE BEHAVIORAL HEALTH - NSBHDCLABSFT_PSY_A_CORE
Lithium was well tolerated with no acute side effects. Lithium medication level was bellow normal. Lithium dose was increased. Follow up lithium blood level, TSH, Cr electrolytes are required for next week.

## 2018-03-02 NOTE — DISCHARGE NOTE BEHAVIORAL HEALTH - MEDICATION SUMMARY - MEDICATIONS TO TAKE
I will START or STAY ON the medications listed below when I get home from the hospital:    benztropine 2 mg oral tablet  -- 1 tab(s) by mouth 2 times a day  -- Indication: For eps    lithium 450 mg oral tablet, extended release  -- 1 tab(s) by mouth 2 times a day x 30 days   -- Do not take this drug if you are pregnant.  It is very important that you take or use this exactly as directed.  Do not skip doses or discontinue unless directed by your doctor.  May cause drowsiness.  Alcohol may intensify this effect.  Use care when operating dangerous machinery.  Obtain medical advice before taking any non-prescription drugs as some may affect the action of this medication.  Swallow whole.  Do not crush.    -- Indication: For Bipolar 1 disorder    OLANZapine 7.5 mg oral tablet  -- 1 tab(s) by mouth 2 times a day x 30 days   -- Indication: For Bipolar 1 disorder    hydrOXYzine hydrochloride 50 mg oral tablet  -- 1 tab(s) by mouth every 6 hours, As Needed -Anxiety MDD:1   -- Indication: For Anxiety    albuterol  -- Indication: For Asthma    docusate sodium 100 mg oral capsule  -- 1 cap(s) by mouth once a day, As needed, Constipation  -- Indication: For Constipation    nicotine 21 mg/24 hr transdermal film, extended release  -- 21 milligram(s) transdermal  -- Indication: For NRT

## 2018-03-02 NOTE — DISCHARGE NOTE BEHAVIORAL HEALTH - NSBHDCTHERAPYFT_PSY_A_CORE
During this stay in the hospital , the patient received medication treatment, individual and group therapy, psychoeducation and medication education.

## 2018-03-02 NOTE — DISCHARGE NOTE BEHAVIORAL HEALTH - NSBHDCRESPONSEFT_PSY_A_CORE
Patient's condition improved. Upon discharge the patient is in good behavioral control, and presents no acute management problems. Patient denies and presents no evidence for suicidal or homicidal ideas plans or intentions. Patient has been sleeping  well, and reports normal appetite and regular bowel movements. Pt's fideliae is agreeable with his discharge at this time. Pt agrees to PHP f/u.

## 2018-03-02 NOTE — DISCHARGE NOTE BEHAVIORAL HEALTH - NSBHDCSUICFCTRMIT_PSY_A_CORE
pt will not hurt self b/c he dose not want to hut his mom and his fiance; fiance is supportive; abstinence from cannabuis use

## 2018-03-02 NOTE — DISCHARGE NOTE BEHAVIORAL HEALTH - CONDITIONS AT DISCHARGE
pt denies  sucidial homicidal ideations pt in control pt verbalized understasnding of discharge instructions need for  folloe up and adherence pt in agreement with follow up pt to leave this afternoon v\s stable

## 2018-03-02 NOTE — DISCHARGE NOTE BEHAVIORAL HEALTH - CARE PROVIDER_API CALL
Ana Maria Barron), [facility]  [address] [place] [address]  Phone: [phone]  Fax: [phone]

## 2018-03-02 NOTE — DISCHARGE NOTE BEHAVIORAL HEALTH - NSBHDCCONDITIONFT_PSY_A_CORE
Patient's condition improved. Upon discharge the patient is in good behavioral control, and presents no acute management problems. Patient denies and presents no evidence for suicidal or homicidal ideas plans or intentions. Patient has been sleeping  well, and reports normal appetite and regular bowel movements.

## 2018-03-02 NOTE — DISCHARGE NOTE BEHAVIORAL HEALTH - PAST PSYCHIATRIC HISTORY
37 year old, domiciled, unemployed, single, / male with self reported pphx of "anti-social personality disorder, bipolar disorder and schizophrenia," multiple [facility] admissions most recently last year, non-adherent with treatment/followup, h/o incarceration, presents to the ED with thought of suicide.

## 2018-03-05 PROBLEM — Z72.89 OTHER PROBLEMS RELATED TO LIFESTYLE: Chronic | Status: ACTIVE | Noted: 2018-02-24

## 2018-03-06 DIAGNOSIS — J45.909 UNSPECIFIED ASTHMA, UNCOMPLICATED: ICD-10-CM

## 2018-03-06 DIAGNOSIS — F12.10 CANNABIS ABUSE, UNCOMPLICATED: ICD-10-CM

## 2018-03-06 DIAGNOSIS — R45.851 SUICIDAL IDEATIONS: ICD-10-CM

## 2018-03-06 DIAGNOSIS — F31.63 BIPOLAR DISORDER, CURRENT EPISODE MIXED, SEVERE, WITHOUT PSYCHOTIC FEATURES: ICD-10-CM

## 2018-03-06 DIAGNOSIS — F60.2 ANTISOCIAL PERSONALITY DISORDER: ICD-10-CM

## 2018-03-06 DIAGNOSIS — F31.9 BIPOLAR DISORDER, UNSPECIFIED: ICD-10-CM

## 2018-03-06 DIAGNOSIS — F43.10 POST-TRAUMATIC STRESS DISORDER, UNSPECIFIED: ICD-10-CM

## 2018-05-22 NOTE — H&P ADULT - CONSTITUTIONAL
----- Message from Stephen Thomason MD sent at 5/22/2018 12:10 PM CDT -----  A1c is very elevated at 10.0. I want him to start Januvia at 100 mg daily and recheck his A1c in 3 months   detailed exam

## 2021-12-04 NOTE — PROGRESS NOTE BEHAVIORAL HEALTH - AFFECT CONGRUENCE
FAMILY HISTORY:  Father  Still living? Unknown  Family history of early CAD, Age at diagnosis: Age Unknown    
Congruent